# Patient Record
Sex: MALE | Race: WHITE | NOT HISPANIC OR LATINO | URBAN - METROPOLITAN AREA
[De-identification: names, ages, dates, MRNs, and addresses within clinical notes are randomized per-mention and may not be internally consistent; named-entity substitution may affect disease eponyms.]

---

## 2019-02-08 ENCOUNTER — COMMUNICATION - HEALTHEAST (OUTPATIENT)
Dept: SCHEDULING | Facility: CLINIC | Age: 34
End: 2019-02-08

## 2019-02-25 ENCOUNTER — OFFICE VISIT - HEALTHEAST (OUTPATIENT)
Dept: FAMILY MEDICINE | Facility: CLINIC | Age: 34
End: 2019-02-25

## 2019-02-25 DIAGNOSIS — L05.91 PILONIDAL CYST: ICD-10-CM

## 2019-02-25 DIAGNOSIS — Z00.00 ROUTINE GENERAL MEDICAL EXAMINATION AT A HEALTH CARE FACILITY: ICD-10-CM

## 2019-02-25 ASSESSMENT — MIFFLIN-ST. JEOR: SCORE: 2034.39

## 2019-12-05 ENCOUNTER — COMMUNICATION - HEALTHEAST (OUTPATIENT)
Dept: FAMILY MEDICINE | Facility: CLINIC | Age: 34
End: 2019-12-05

## 2020-07-06 ENCOUNTER — OFFICE VISIT - HEALTHEAST (OUTPATIENT)
Dept: PODIATRY | Facility: CLINIC | Age: 35
End: 2020-07-06

## 2020-07-06 DIAGNOSIS — M72.2 PLANTAR FASCIITIS: ICD-10-CM

## 2020-07-15 ENCOUNTER — COMMUNICATION - HEALTHEAST (OUTPATIENT)
Dept: PODIATRY | Facility: CLINIC | Age: 35
End: 2020-07-15

## 2020-07-16 ENCOUNTER — OFFICE VISIT - HEALTHEAST (OUTPATIENT)
Dept: PODIATRY | Facility: CLINIC | Age: 35
End: 2020-07-16

## 2020-07-16 DIAGNOSIS — M72.2 PLANTAR FASCIITIS: ICD-10-CM

## 2021-03-08 ENCOUNTER — OFFICE VISIT - HEALTHEAST (OUTPATIENT)
Dept: FAMILY MEDICINE | Facility: CLINIC | Age: 36
End: 2021-03-08

## 2021-03-08 DIAGNOSIS — M72.2 PLANTAR FASCIITIS: ICD-10-CM

## 2021-03-08 DIAGNOSIS — Z00.00 ROUTINE GENERAL MEDICAL EXAMINATION AT A HEALTH CARE FACILITY: ICD-10-CM

## 2021-03-08 DIAGNOSIS — L98.9 SKIN LESION: ICD-10-CM

## 2021-03-08 LAB
CHOLEST SERPL-MCNC: 185 MG/DL
FASTING STATUS PATIENT QL REPORTED: YES
FASTING STATUS PATIENT QL REPORTED: YES
GLUCOSE BLD-MCNC: 116 MG/DL (ref 70–99)
HDLC SERPL-MCNC: 45 MG/DL
LDLC SERPL CALC-MCNC: 119 MG/DL
TRIGL SERPL-MCNC: 106 MG/DL

## 2021-03-08 ASSESSMENT — MIFFLIN-ST. JEOR: SCORE: 2037.12

## 2021-03-09 ENCOUNTER — COMMUNICATION - HEALTHEAST (OUTPATIENT)
Dept: FAMILY MEDICINE | Facility: CLINIC | Age: 36
End: 2021-03-09

## 2021-03-24 ENCOUNTER — RECORDS - HEALTHEAST (OUTPATIENT)
Dept: ADMINISTRATIVE | Facility: OTHER | Age: 36
End: 2021-03-24

## 2021-05-25 ENCOUNTER — RECORDS - HEALTHEAST (OUTPATIENT)
Dept: ADMINISTRATIVE | Facility: CLINIC | Age: 36
End: 2021-05-25

## 2021-05-27 VITALS
SYSTOLIC BLOOD PRESSURE: 124 MMHG | HEART RATE: 72 BPM | TEMPERATURE: 98.1 F | RESPIRATION RATE: 14 BRPM | DIASTOLIC BLOOD PRESSURE: 82 MMHG

## 2021-06-02 VITALS — WEIGHT: 229.2 LBS | HEIGHT: 74 IN | BODY MASS INDEX: 29.41 KG/M2

## 2021-06-04 NOTE — TELEPHONE ENCOUNTER
Who is calling:  Patient  Reason for Call:    Patient is wanting to have a Vasectomy.  Does he need to see Provider first or does he need to be referred to another Provider.  Please reach out to patient and advise.  Date of last appointment with primary care: 2/25/19  Okay to leave a detailed message: Yes

## 2021-06-04 NOTE — TELEPHONE ENCOUNTER
Spoke to pt, gave information for Dr. Stokes at [facility] ([phone]) and phone number for [facility] ([phone]). Pt will call back if he is needing a referral to urology. No referral needed for Dr. Stokes.

## 2021-06-05 VITALS
WEIGHT: 229.8 LBS | BODY MASS INDEX: 29.49 KG/M2 | SYSTOLIC BLOOD PRESSURE: 125 MMHG | RESPIRATION RATE: 16 BRPM | HEART RATE: 74 BPM | DIASTOLIC BLOOD PRESSURE: 75 MMHG | HEIGHT: 74 IN

## 2021-06-09 NOTE — PROGRESS NOTES
FOOT AND ANKLE SURGERY/PODIATRY Progress Note        ASSESSMENT:   Plantar fasciitis bilateral feet    HPI: Yoshi Goyal was seen again today with continued complaints of pain involving both heels.  The patient stated that the right heel has markedly improved.  He continues to have some residual pain involving the left heel.  He has been taking ibuprofen.  He has been doing stretching exercises.  He mentioned that his brother was a physical therapist.  His brother gave him a series of exercises and stretches to do and he feels they have been beneficial.  He does have moderate post static dyskinesia.  More so on the left than on the right.    History reviewed. No pertinent past medical history.    Past Surgical History:   Procedure Laterality Date     CT REMV PILONIDAL LESION SIMPLE      Description: Pilonidal Cyst Resection;  Recorded: 09/29/2012;       No Known Allergies      Current Outpatient Medications:      ibuprofen (ADVIL,MOTRIN) 600 MG tablet, Take 1 tablet (600 mg total) by mouth 3 (three) times a day for 14 days. Take with food, Disp: 42 tablet, Rfl: 0     MULTIVIT &MINERALS/FERROUS FUM (MULTI VITAMIN ORAL), Take by mouth., Disp: , Rfl:     Family History   Problem Relation Age of Onset     No Medical Problems Mother      Hyperlipidemia Father      Hypertension Father      Hyperlipidemia Paternal Aunt      Hypertension Paternal Aunt      Diabetes Maternal Grandmother      Diabetes Maternal Grandfather      Cancer Maternal Grandfather      Heart disease Paternal Grandmother      Heart attack Paternal Grandmother      Alzheimer's disease Paternal Grandfather        Social History     Socioeconomic History     Marital status:      Spouse name: Not on file     Number of children: Not on file     Years of education: Not on file     Highest education level: Not on file   Occupational History     Not on file   Social Needs     Financial resource strain: Not on file     Food insecurity     Worry: Not  on file     Inability: Not on file     Transportation needs     Medical: Not on file     Non-medical: Not on file   Tobacco Use     Smoking status: Never Smoker     Smokeless tobacco: Never Used   Substance and Sexual Activity     Alcohol use: Yes     Alcohol/week: 2.0 - 3.0 standard drinks     Types: 2 - 3 Cans of beer per week     Drug use: Never     Sexual activity: Not on file   Lifestyle     Physical activity     Days per week: Not on file     Minutes per session: Not on file     Stress: Not on file   Relationships     Social connections     Talks on phone: Not on file     Gets together: Not on file     Attends Rastafarian service: Not on file     Active member of club or organization: Not on file     Attends meetings of clubs or organizations: Not on file     Relationship status: Not on file     Intimate partner violence     Fear of current or ex partner: Not on file     Emotionally abused: Not on file     Physically abused: Not on file     Forced sexual activity: Not on file   Other Topics Concern     Not on file   Social History Narrative     Not on file       10 point Review of Systems is negative      There were no vitals filed for this visit.    BMI= There is no height or weight on file to calculate BMI.    OBJECTIVE:  General appearance: Patient is alert and fully cooperative with history & exam.  No sign of distress is noted during the visit.  Vascular: Dorsalis pedis and posterior tibial pulses are palpable. There is pedal hair growth bilaterally.  CFT < 3 sec from anterior tibial surface to distal digits bilaterally. There is no appreciable edema noted.  Dermatologic: Turgor and texture are within normal limits. No coloration or temperature changes. No primary or secondary lesions noted.  Neurologic: All epicritic and proprioceptive sensations are grossly intact bilaterally.  Musculoskeletal: All active and passive ankle, subtalar, midtarsal, and 1st MPJ range of motion are grossly intact without pain  or crepitus, with the exception of none. Manual muscle strength is within normal limits bilaterally. All dorsiflexors, plantarflexors, invertors, evertors are intact laterally. Tenderness present to the plantar medial aspect of the left heel on palpation.  No tenderness to bilateral feet or ankles with range of motion.  There is an increased height of the medial longitudinal arch noted bilaterally.  Calf is soft/non-tender without warmth/induration    Imaging:         No results found.         TREATMENT:  The patient was given a cortisone injection in the left heel today consisting of 1 cc of 2% lidocaine plain and 1 cc of dexamethasone sodium phosphate.  The patient stated that he does have plans to get his orthotics as previously prescribed.  He is to continue ibuprofen as needed.  The patient is to return to the clinic as needed.        Phong Cook; BRANNON  [facility]le Surgery/Podiatry

## 2021-06-09 NOTE — PROGRESS NOTES
New Plantar fasciits, chronic heel and arch pain bilaterally. Pt has tried some inserts in the past    FOOT AND ANKLE SURGERY/PODIATRY CONSULT NOTE         ASSESSMENT:   Plantar fasciitis both feet      TREATMENT:  I have recommended a cortisone injection in both heels.  I have also recommended orthotics.  The patient was started on ibuprofen 600 mg 1 tab 3 times daily.  The patient is to return to the clinic in 1 week for a face-to-face visit at which time he will be given a cortisone injection in both heels..        HPI: I was asked to see Yoshi Goyal today for evaluation and treatment of bilateral heel pain.  This video visit took place with the patient's permission.  Medial start time was 1320.  Video in time was 1330.  The patient stated that he has had this heel pain for approximately 9 months.  He stated that last October he was exercising regularly and following his activities he noticed that he had severe pain on the bottom of both heels.  He he feels that his previous job contributed to his heel pain.  He used to work on concrete floors for several hours daily.  He has attempted to treat his symptoms with night splints, icing, and stretching.  He indicated that he gets very little relief.  The pain is only partially relieved with nonweightbearing and resting.  The pain is more pronounced when he first gets out of bed in the mornings.  He does have post static dyskinesia.  He denies any redness or swelling surrounding his heel.  The pain is located on the bottom of both heels.  He denies any other previous treatment.  He denies any particular trauma to his feet.  The patient was seen in consultation at the request of Ten Chavez MD for evaluation and treatment of bilateral heel pain.     History reviewed. No pertinent past medical history.    Past Surgical History:   Procedure Laterality Date     OK REMV PILONIDAL LESION SIMPLE      Description: Pilonidal Cyst Resection;  Recorded: 09/29/2012;        No Known Allergies      Current Outpatient Medications:      MULTIVIT &MINERALS/FERROUS FUM (MULTI VITAMIN ORAL), Take by mouth., Disp: , Rfl:     Family History   Problem Relation Age of Onset     No Medical Problems Mother      Hyperlipidemia Father      Hypertension Father      Hyperlipidemia Paternal Aunt      Hypertension Paternal Aunt      Diabetes Maternal Grandmother      Diabetes Maternal Grandfather      Cancer Maternal Grandfather      Heart disease Paternal Grandmother      Heart attack Paternal Grandmother      Alzheimer's disease Paternal Grandfather        Social History     Socioeconomic History     Marital status:      Spouse name: Not on file     Number of children: Not on file     Years of education: Not on file     Highest education level: Not on file   Occupational History     Not on file   Social Needs     Financial resource strain: Not on file     Food insecurity     Worry: Not on file     Inability: Not on file     Transportation needs     Medical: Not on file     Non-medical: Not on file   Tobacco Use     Smoking status: Never Smoker     Smokeless tobacco: Never Used   Substance and Sexual Activity     Alcohol use: Yes     Alcohol/week: 2.0 - 3.0 standard drinks     Types: 2 - 3 Cans of beer per week     Drug use: Never     Sexual activity: Not on file   Lifestyle     Physical activity     Days per week: Not on file     Minutes per session: Not on file     Stress: Not on file   Relationships     Social connections     Talks on phone: Not on file     Gets together: Not on file     Attends Orthodox service: Not on file     Active member of club or organization: Not on file     Attends meetings of clubs or organizations: Not on file     Relationship status: Not on file     Intimate partner violence     Fear of current or ex partner: Not on file     Emotionally abused: Not on file     Physically abused: Not on file     Forced sexual activity: Not on file   Other Topics Concern      Not on file   Social History Narrative     Not on file       BMI= There is no height or weight on file to calculate BMI.    General appearance: Patient is alert and fully cooperative with history & exam.  No sign of distress is noted during the visit.    Phong Cook; BRANNON  [facility]

## 2021-06-15 NOTE — PROGRESS NOTES
Assessment/ Plan     1. Routine general medical examination at a health care facility    Recommend that he remain physically active.  His goal is 30 minutes of aerobic exercise most days    Check laboratory testing as noted  - Lipid Cascade  - Glucose    Hepatitis C and HIV testing are declined. Patient is low risk    2. Plantar fasciitis    Continue to use orthotics  Follow-up with Dr. Cook recommended if not improving    3. Skin lesions    Refer to dermatology for a skin check  - Ambulatory referral to Dermatology      Subjective:       Yoshi Goyal is a 35 y.o. male who presents for a complete physical examination.  His medical history is generally unremarkable for chronic health conditions.    In the remote past he had a pilonidal cyst that required excision on 2 occasions.    More recently, he has been dealing with plantar fasciitis.  He has had some pain in his heel.  This was worse after prolonged period of immobility.  He followed up with Dr. Cook, podiatry.  He had a cortisone injection.  He has been using orthotics and that has been helpful.    His last total cholesterol was 184 with an LDL of 129.    Overall, he has been feeling well.  Review of systems is negative for headache, dizziness, chest pain, or palpitations.  He denies bowel changes or urinary concerns.  He drinks occasional alcohol.     Family history is updated.  His father had high blood pressure and high cholesterol. Two grandparents have diabetes.       The following portions of the patient's history were reviewed and updated as appropriate: allergies, current medications, past family history, past medical history, past social history, past surgical history and problem list. Medications have been reconciled    Review of Systems   A 12 point comprehensive review of systems was negative except as noted.      Current Outpatient Medications   Medication Sig Dispense Refill     MULTIVIT &MINERALS/FERROUS FUM (MULTI VITAMIN ORAL) Take by  "mouth.       No current facility-administered medications for this visit.        Objective:      /75   Pulse 74   Resp 16   Ht 6' 2\" (1.88 m)   Wt (!) 229 lb 12.8 oz (104.2 kg)   BMI 29.50 kg/m      General appearance: alert, appears stated age   Head: normocephalic, without obvious abnormality, atraumatic  Eyes: conjunctivae/corneas clear. PERRL, EOM's intact.   Ears: normal TM's and external ear canals both ears  Nose: nares normal. Septum midline. Mucosa normal.  Throat: lips, mucosa, and tongue normal; teeth and gums normal  Neck: no adenopathy, supple, symmetrical, trachea midline and thyroid not enlarged, symmetric   Lungs: clear to auscultation bilaterally  Heart: regular rate and rhythm, S1, S2 normal, no murmur, click, rub or gallop  Abdomen: soft, non-tender; no masses,  no organomegaly  .  Urinary: No scrotal masses, no inguinal hernia  Extremities: extremities normal, atraumatic, no cyanosis or edema  Skin: skin color, texture, turgor normal  He has multiple brownish macular lesions on his trunk  Lymph nodes: Cervical nodes normal.  Neurologic: Alert and oriented X 3           Recent Results (from the past 168 hour(s))   Lipid Cascade   Result Value Ref Range    Cholesterol 185 <=199 mg/dL    Triglycerides 106 <=149 mg/dL    HDL Cholesterol 45 >=40 mg/dL    LDL Calculated 119 <=129 mg/dL    Patient Fasting > 8hrs? Yes    Glucose   Result Value Ref Range    Glucose 116 (H) 70 - 99 mg/dL    Patient Fasting > 8hrs? Yes           This note has been dictated using voice recognition software. Any grammatical or context distortions are unintentional and inherent to the software    Ten Brewster MD    "

## 2021-06-15 NOTE — PATIENT INSTRUCTIONS - HE
Increase physical activity.  Your goal is 30 minutes of aerobic exercise most days  Continue to monitor your weight.  Limit carbohydrates in your diet.  This includes starches, pastas, and refined sugars  We will check your cholesterol panel and blood sugar test  Follow-up with dermatology for skin check and to discuss hair loss options

## 2021-06-16 PROBLEM — M72.2 PLANTAR FASCIITIS: Status: ACTIVE | Noted: 2021-03-08

## 2021-06-18 NOTE — PATIENT INSTRUCTIONS - HE
Patient Instructions by Mirtha Palm LPN at 7/16/2020  8:00 AM     Author: Mirtha Palm LPN Service: -- Author Type: Licensed Nurse    Filed: 7/16/2020  8:41 AM Encounter Date: 7/16/2020 Status: Addendum    : Catarino Amaya RN (Registered Nurse)    Related Notes: Original Note by Catarino Amaya RN (Registered Nurse) filed at 7/16/2020  8:23 AM         Today in clinic you received an injection of Dexamethasone mixed with lidocaine. As the lidocaine wears off you may experience more pain. This pain may last up to several days as the injection sets up. It can take up to a week before you feel the full benefit of your injection.    Please call our office if you begin to see signs of an infection. Signs or symptoms of an infection may include: fever, redness, warmth, or swelling at the injection sight, and discolored drainage. [phone].      What is Plantar Fasciitis?  Plantar Fasciitis also referred to as heel pain syndrome is the most common cause cited for pain in heels. Plantar Fasciitis is the pain and inflammation at the point where the flat band of tissue called the plantar fascia connects the heel bone to the toes. Plantar fascia maintains the arch of the foot. Applying pressure on it will make it swollen, weak, and irritated. This will make the heel of your foot to ache when you walk or stand for a prolonged period.    Symptoms  Most people suffering from Plantar Fasciitis experience pain when they walk after resting their feet for a long duration. You may experience less pain and stiffness after a few steps. But your foot may hurt more as the day goes on. It may hurt the most when you climb stairs or after you stand for a long time. Continuous foot pain at night might be due to different problems like arthritis or nerve problems.    Causes  Straining the ligament which supports the arch can cause Plantar Fasciitis. Repeated straining can cause tiny tears in the ligament which lead to  pain and swelling. Plantar Fasciitis is very evident in cases of:  Tight Achilles tendon or calf muscles   Running long distances, especially on hard & uneven surfaces   Problems of the foot arch   Sudden obesity   Wearing shoes with soft soles or poor arch support   In-toeing    Treatment Options  Anti-Inflammatory Medication   - Ibuprofen 600 mg by mouth 3 times per day with food     (discontinue if stomach irritation occurs)    - Topical pain creams from [facility].     * apply 1-2 grams to painful areas 3 times per day prior to      Stretching    - Oral Prednisone  Orthotics (functional orthotics) Insurance Code:  (custome made, doctor prescribed)   Steroid injections    - Maximum of 3 injections in one year.   Night Splint   Physical therapy    - Stretching, Ultrasound, etc...

## 2021-06-18 NOTE — PATIENT INSTRUCTIONS - HE
Patient Instructions by Kylie Burton RN at 7/6/2020  1:30 PM     Author: Kylie Burton RN Service: -- Author Type: Registered Nurse    Filed: 7/6/2020  1:39 PM Encounter Date: 7/6/2020 Status: Signed    : Kylie Burton RN (Registered Nurse)       Patient Education     Cortisone Injections    Cortisone is a type of steroid. It can greatly reduce swelling, redness, inflammation, and pain. Being injected with cortisone is simple and doesnt take long. Your doctor may ask you questions about your health. Cortisone can affect certain health conditions, such as diabetes.  Why have a cortisone injection?  Injecting cortisone can sometimes relieve pain for anything from a sports injury to arthritis. Your doctor may suggest an injection if rest, splints, physical therapy, rehabilitation exercises, or oral medicine doesnt relieve your pain. Injecting cortisone is simpler than having surgery. And cortisone may provide the lasting pain relief that can help you get out and enjoy life again. A recent study showed steroid injections may actually worsen osteoarthritis of the knee. Be sure to discuss all options with your healthcare provider. Injections are usually used no more than 3 to 4 times a year in one area of the body.  Getting the injection  Your doctor will start by cleaning and possibly numbing your skin at the injection site. Next youll be injected with local anesthetics (for short-term pain relief) and cortisone. The injection may last a few moments. A small bandage will be put over the injection site. Youll then be ready to go home.  After your injection  After being injected, make sure you dont injure the treated region. But stay active. Enjoy a walk or some other mild activity. Just be careful not to strain the region that gave you trouble.  The next day  Some people feel more pain after being injected. This is normal, and it will go away soon. Applying ice for 20 minutes at a time to your injury may  reduce the increased pain. Rest for the first day or two. You dont need to stay in bed. But avoid tasks that may strain the injured region.  Cortisone injections can increase your blood sugar for several days after the injection. If you have diabetes, follow your blood sugar closely during this time. Follow your regular plan for what to do when your blood sugar is elevated.  Date Last Reviewed: 9/1/2017 2000-2019 [employer]. [address] [place] [address], [place] [address]. All rights reserved. This information is not intended as a substitute for professional medical care. Always follow your healthcare professional's instructions.    [facility] Virtual Check-In Number    [phone]    When you arrive for your IN OFFICE visit. Please call this number from the parking lot.      The staff will then virtually check you in and meet you at the door to escort you to a room.    If you arrive early and a room is not yet ready for you staff may have you wait can call you back when they are ready.     Please remember to wear a mask into your appointment     No Visitors Allowed    If you are having any symptoms- cough, fever, rash, loss of taste and smell or shortness of breath we ask that you please call and reschedule your appointment.

## 2021-06-21 NOTE — LETTER
Letter by Ten Brewster MD at      Author: Ten Brewster MD Service: -- Author Type: --    Filed:  Encounter Date: 3/9/2021 Status: (Other)         Yoshi Goyal  [address] [place] [address]             March 9, 2021         Dear Mr. Goyal,    Below are the results from your recent visit:    Resulted Orders   Lipid Cascade   Result Value Ref Range    Cholesterol 185 <=199 mg/dL    Triglycerides 106 <=149 mg/dL    HDL Cholesterol 45 >=40 mg/dL    LDL Calculated 119 <=129 mg/dL    Patient Fasting > 8hrs? Yes    Glucose   Result Value Ref Range    Glucose 116 (H) 70 - 99 mg/dL    Patient Fasting > 8hrs? Yes     Narrative    Fasting Glucose reference range is 70-99 mg/dL per  American Diabetes Association (ADA) guidelines.                               Yoshi    Your cholesterol numbers are excellent.    Your blood sugar test is elevated.  Normal fasting glucose is less than 100.  Make sure to limit carbohydrates in your diet.  This includes processed, starches, and refined sugars.  This can be rechecked in 6 months.  Regular aerobic exercise can be helpful as well.    Please have me know if you have questions or concerns.    Please call with questions or contact us using [employer].    Sincerely,        Electronically signed by Ten Brewster MD

## 2021-06-23 NOTE — TELEPHONE ENCOUNTER
Pt c/o numbness on skin of leg from knee down, traveling yesterday four hour flight, sat at tarmac for an hour, foot was slightly swollen. Noticed after getting home from airport yesterday and numbness went away briefly this morning but then returned. Foot is no longer swollen at this time. No pain or a difficulty walking per pt.     Writer advised pt to have another adult drive him to the ER now for evaluation per protocol.     Pt verbalizes understanding and agrees to plan.     Reason for Disposition    [1] Numbness (i.e., loss of sensation) of the face, arm / hand, or leg / foot on one side of the body AND [2] sudden onset AND [3] brief (now gone)    Protocols used: NEUROLOGIC DEFICIT-A-AH

## 2021-06-24 NOTE — PROGRESS NOTES
Assessment/ Plan     1. Routine general medical examination at a health care facility    Recommend that he increase physical activity.  His goal is 30 minutes of aerobic exercise most days  Recommend monitoring his weight    Recommend follow-up for lipid cascade and glucose check in the future    2. Pilonidal cyst  Status post excision    There are no ongoing concerns with his pilonidal cyst    3.  Palpitations, resolved    Continue to monitor  Follow-up as needed    4.  Tonsil stones    He may consider guarding or use a WaterPik  If having ongoing issues refer to ENT      Subjective:       Yoshi Goyal is a 33 y.o. male who presents to the clinic for a complete physical examination.  He is generally quite healthy.  He has had previous pilonidal cyst that required excision on two occasions.  He has not had ongoing problems regarding that.    He has had his cholesterol checked previously.  His HDL was mildly low.  He admits he can get more aerobic exercise.  He used to play hockey.  However, he is very busy now as he is  and has three young sons ages five or younger.  He continues to be quite busy in his work.    His weight can fluctuate from 220 pounds to 230 pounds.  He would like to work on weight loss.  In the past he has had some mild right knee discomfort that responded to physical therapy.  He did have some palpitations in the past which may have been secondary to caffeine intake with with Mountain Dew.  His palpitations have resolved.    He does note that he did experience some tonsil stones recently.  He was able to extrude those with tweezers.  He has not had ongoing issues.    He reports he generally is feeling well.  His mood has been fine.  Denies headache, dizziness, chest pain, palpitations, bowel changes, or other concerns.      The following portions of the patient's history were reviewed and updated as appropriate: allergies, current medications, past family history, past medical history,  "past social history, past surgical history and problem list. Medications have been reconciled    Review of Systems   A 12 point comprehensive review of systems was negative except as noted.      Current Outpatient Medications   Medication Sig Dispense Refill     MULTIVIT &MINERALS/FERROUS FUM (MULTI VITAMIN ORAL) Take by mouth.       No current facility-administered medications for this visit.        Objective:      /76   Pulse (!) 52   Ht 6' 2\" (1.88 m)   Wt (!) 229 lb 3.2 oz (104 kg)   BMI 29.43 kg/m        General appearance: alert, appears stated age and cooperative  Head: Normocephalic, without obvious abnormality, atraumatic  Eyes: conjunctivae/corneas clear. PERRL, EOM's intact.   Ears: normal TM's and external ear canals both ears  Nose: Nares normal. Septum midline. Mucosa normal. No drainage or sinus tenderness.  Throat: lips, mucosa, and tongue normal; teeth and gums normal  Neck: no adenopathy, supple, symmetrical, trachea midline and thyroid not enlarged, symmetric, no tenderness/mass/nodules  Back: symmetric, no curvature. ROM normal. No CVA tenderness.  Lungs: clear to auscultation bilaterally  Heart: regular rate and rhythm, S1, S2 normal, no murmur, click, rub or gallop  Abdomen: soft, non-tender; bowel sounds normal; no masses,  no organomegaly  Genitourinary: Penis is circumcised, no scrotal masses, no inguinal hernia  Extremities: extremities normal, atraumatic, no cyanosis or edema  Skin: Skin color, texture, turgor normal. No rashes or lesions  Lymph nodes: Cervical nodes normal.  Neurologic: Alert and oriented X 3  Cranial nerves II through XII are intact         No results found for this or any previous visit (from the past 168 hour(s)).       This note has been dictated using voice recognition software. Any grammatical or context distortions are unintentional and inherent to the software  "

## 2021-06-24 NOTE — PATIENT INSTRUCTIONS - HE
Increase physical activity.  Your goal is 30 minutes of aerobic exercise most days  You typically have had very good cholesterol numbers though your HDL or good cholesterol has been a bit low  Exercise can help bring up your HDL  You can consider a cholesterol check and blood sugar check in the future if desired  Consider ongoing gargling or use of a WaterPik if developing tonsil stones  If this becomes a recurrent issue you can see an ENT physician

## 2021-06-26 ENCOUNTER — HEALTH MAINTENANCE LETTER (OUTPATIENT)
Age: 36
End: 2021-06-26

## 2021-07-03 NOTE — ADDENDUM NOTE
Addendum Note by Zoraida Alvarez DPM at 7/6/2020  1:30 PM     Author: Zoraida Alvarez DPM Service: -- Author Type: Physician    Filed: 7/6/2020  1:34 PM Encounter Date: 7/6/2020 Status: Signed    : Zoraida Alvarez DPM (Physician)    Addended by: ZORAIDA ALVAREZ on: 7/6/2020 01:34 PM        Modules accepted: Orders

## 2021-10-16 ENCOUNTER — HEALTH MAINTENANCE LETTER (OUTPATIENT)
Age: 36
End: 2021-10-16

## 2022-05-28 ENCOUNTER — HEALTH MAINTENANCE LETTER (OUTPATIENT)
Age: 37
End: 2022-05-28

## 2022-10-01 ENCOUNTER — HEALTH MAINTENANCE LETTER (OUTPATIENT)
Age: 37
End: 2022-10-01

## 2023-06-04 ENCOUNTER — HEALTH MAINTENANCE LETTER (OUTPATIENT)
Age: 38
End: 2023-06-04

## 2023-10-20 ENCOUNTER — TRANSFERRED RECORDS (OUTPATIENT)
Dept: HEALTH INFORMATION MANAGEMENT | Facility: CLINIC | Age: 38
End: 2023-10-20